# Patient Record
(demographics unavailable — no encounter records)

---

## 2025-04-25 NOTE — ASSESSMENT
[Vaccines Reviewed] : Immunizations reviewed today. Please see immunization details in the vaccine log within the immunization flowsheet.  [FreeTextEntry1] : 24 year old female here to establish care and CPE and evaluation of right ankle pain s/p injury 1 month ago.

## 2025-04-25 NOTE — PLAN
[FreeTextEntry1] : #CPE - AV labs ordered - STD screen ordered - Immunization: Will get records  - Cancer screening: Recommend scheduling follow up appointment with Gyn for Cervical cancer screening.   #Sinus bradycardia HR 55 in office today. Patient reports her baseline heart rate is in 50s and with prior workup with PCP including EKG demonstrated sinus bradycardia. Asymptomatic with no syncope, dizziness, palpitations. She is very active and runs 5x/week.  -Will obtain IMANI from prior PCP, Dr. Hudson   #Right ankle pain likely due to sprain x 1 month with lower suspicion for fracture  - Recommend obtaining Xray given ongoing pain  - PT referral placed  - Follow up if pain worsens or fails to improve, consider ortho referral   Visit conducted as part of ongoing, longitudinal medical care for patient's medical and other issues.

## 2025-04-25 NOTE — PHYSICAL EXAM
[No Acute Distress] : no acute distress [Well Nourished] : well nourished [Well Developed] : well developed [Normal Sclera/Conjunctiva] : normal sclera/conjunctiva [EOMI] : extraocular movements intact [Normal Outer Ear/Nose] : the outer ears and nose were normal in appearance [Normal Oropharynx] : the oropharynx was normal [Supple] : supple [No Respiratory Distress] : no respiratory distress  [No Accessory Muscle Use] : no accessory muscle use [Clear to Auscultation] : lungs were clear to auscultation bilaterally [Normal Rate] : normal rate  [Regular Rhythm] : with a regular rhythm [No Edema] : there was no peripheral edema [Soft] : abdomen soft [Non Tender] : non-tender [Non-distended] : non-distended [No Spinal Tenderness] : no spinal tenderness [No Joint Swelling] : no joint swelling [Grossly Normal Strength/Tone] : grossly normal strength/tone [No Rash] : no rash [Coordination Grossly Intact] : coordination grossly intact [No Focal Deficits] : no focal deficits [Normal Gait] : normal gait [Normal Affect] : the affect was normal [Normal Insight/Judgement] : insight and judgment were intact

## 2025-04-25 NOTE — HISTORY OF PRESENT ILLNESS
[de-identified] : 24 year old female with no past medical history here to establish care and CPE.   Acute concerns: She rolled her right ankle 1 month ago while running. No workup completed. Continues to have some swelling on medial side and pain with certain movements, specifically eversion/inversion.

## 2025-04-25 NOTE — HEALTH RISK ASSESSMENT
[Yes] : Yes [2 - 4 times a month (2 pts)] : 2-4 times a month (2 points) [1 or 2 (0 pts)] : 1 or 2 (0 points) [Never (0 pts)] : Never (0 points) [No] : In the past 12 months have you used drugs other than those required for medical reasons? No [0] : 2) Feeling down, depressed, or hopeless: Not at all (0) [PHQ-2 Negative - No further assessment needed] : PHQ-2 Negative - No further assessment needed [Patient reported PAP Smear was normal] : Patient reported PAP Smear was normal [# of Members in Household ___] :  household currently consist of [unfilled] member(s) [Employed] : employed [Single] : single [Sexually Active] : sexually active [de-identified] : Ob/Gyn [Audit-CScore] : 2 [de-identified] : Runs 5x/week  [de-identified] : Vegetarian  [GAU7Mxies] : 0 [PapSmearDate] : 4/24/2024 [de-identified] : Lives with Roommates in [place]  [FreeTextEntry2] :   [de-identified] : Would like STI screening